# Patient Record
Sex: FEMALE | Race: WHITE
[De-identification: names, ages, dates, MRNs, and addresses within clinical notes are randomized per-mention and may not be internally consistent; named-entity substitution may affect disease eponyms.]

---

## 2016-06-24 VITALS
DIASTOLIC BLOOD PRESSURE: 84 MMHG | SYSTOLIC BLOOD PRESSURE: 131 MMHG | SYSTOLIC BLOOD PRESSURE: 131 MMHG | DIASTOLIC BLOOD PRESSURE: 84 MMHG | DIASTOLIC BLOOD PRESSURE: 84 MMHG | SYSTOLIC BLOOD PRESSURE: 131 MMHG

## 2017-10-23 ENCOUNTER — HOSPITAL ENCOUNTER (OUTPATIENT)
Dept: HOSPITAL 61 - KCIC MAMMO | Age: 57
Discharge: HOME | End: 2017-10-23
Attending: FAMILY MEDICINE
Payer: MEDICARE

## 2017-10-23 DIAGNOSIS — M48.061: ICD-10-CM

## 2017-10-23 DIAGNOSIS — M51.26: ICD-10-CM

## 2017-10-23 DIAGNOSIS — M41.86: ICD-10-CM

## 2017-10-23 DIAGNOSIS — Z12.31: Primary | ICD-10-CM

## 2017-10-23 PROCEDURE — 72148 MRI LUMBAR SPINE W/O DYE: CPT

## 2017-10-23 PROCEDURE — 77063 BREAST TOMOSYNTHESIS BI: CPT

## 2017-10-23 NOTE — [facility]
EXAM: Lumbar spine MRI without contrast.

 

HISTORY: Spinal stenosis. Lower back pain. Bilateral lower extremity 

weakness.

 

TECHNIQUE: Multiplanar, multisequence magnetic resonance imaging of the 

lumbar spine was performed without contrast.

 

COMPARISON: 12/22/2014

 

FINDINGS: There is lumbar dextroscoliosis centered at L3. There is slight 

retrolisthesis of L4 on L5 and L5 on S1. There is disc desiccation at 

L3-S1. There is degenerative endplate remodeling and Schmorl's node 

formation at these levels. The conus terminates at L1-L2. No suspicious 

osseous lesion is seen.

 

At L1-L2, there is mild bilateral facet arthropathy. There is no stenosis.

 

At L2-L3, there is mild bilateral facet arthropathy. There is no stenosis.

 

At L3-L4, there is a disc bulge and endplate osteophytosis. There is 

moderate facet arthropathy. There is mild central canal stenosis.

 

At L4-L5, there are bilateral foraminal to extraforaminal disc protrusions

superimposed on a disc bulge and endplate osteophytosis. There is mild 

right and moderate left facet arthropathy. There is hypertrophy of the 

ligamentum flavum. There is retrolisthesis. There is mild bilateral 

foraminal stenosis with abutment of the exiting L4 nerve root. There is 

moderate central canal stenosis.

 

At L5-S1, there is a broad-based posterior central disc protrusion and 

right foraminal to lateral disc protrusion and osteophyte complex 

superimposed on a diffuse disc bulge and right lateral predominant 

endplate osteophytosis. There is retrolisthesis. There is mild right 

foraminal stenosis with abutment of the exiting right L5 nerve root.

 

IMPRESSION: 

1. Multilevel degenerative change throughout the lumbar spine, described 

in detail above. This results in mild central canal stenosis at L3-L4, 

mild bilateral foraminal and moderate central canal stenosis at L4-L5, and

mild right foraminal stenosis at L5-S1. The previously demonstrated 

superior right lateral recess extrusion and right extraforaminal extrusion

at L4-L5 are no longer seen. However, the underlying bilateral foraminal 

to extra foraminal disc protrusions at this level have increased compared 

to the prior study. The aforementioned degenerative changes are similar 

the remainder of the lumbar levels compared to the prior study.

2. Mild scoliosis and mild retrolisthesis at the lower lumbar levels, 

stable in appearance.

 

Electronically signed by: Erika Boyce MD (10/23/2017 5:24 PM) 

[facility]

## 2017-10-24 NOTE — [facility]
DATE: 10/23/2017



EXAM: MAMMO DOUGLAS SCREENING BILATERAL



HISTORY: Asymptomatic screening mammogram. History of benign right breast

biopsy in 2003.



COMPARISON: Prior mammograms from 11/4/2009



This study was interpreted with the benefit of Computerized Aided Detection

(CAD).





The breast parenchyma shows scattered fibroglandular densities. Breast

parenchyma level B.





FINDINGS: Bilateral CC and MLO views of the breasts were performed. Bilateral

breast tomosynthesis was performed in CC and MLO projections.



Right breast: There is a small group of microcalcifications in the right

breast at middle depth, slightly inferior to the posterior nipple line on the

right MLO view and slightly lateral to the posterior nipple line on the CC

view. Further evaluation with a true lateral and spot magnification CC and

true lateral views are recommended.



Left breast: There are no suspicious microcalcifications, masses or areas of

architectural distortion.



Left breast is stable from prior mammogram.  





IMPRESSION: 

1. Incomplete right mammogram. Additional views are recommended, as detailed

above.

2. Negative left mammogram.







BI-RADS CATEGORY: 0 INCOMPLETE: NEEDS ADDITIONAL IMAGING EVALUATION AND/OR

PRIOR MAMMOGRAMS FOR COMPARISON.



RECOMMENDED FOLLOW-UP: ADD ADDITIONAL IMAGING



PQRS compliance statement: 



Mammography is a sensitive method for finding small breast cancers, but it

does not detect them all and is not a substitute for careful clinical

examination.  A negative mammogram does not negate a clinically suspicious

finding and should not result in delay in biopsying a clinically suspicious

abnormality.



"Our facility is accredited by the American [employer] Mammography

Program."

## 2017-11-09 ENCOUNTER — HOSPITAL ENCOUNTER (OUTPATIENT)
Dept: HOSPITAL 61 - KCIC MAMMO | Age: 57
Discharge: HOME | End: 2017-11-09
Attending: FAMILY MEDICINE
Payer: MEDICARE

## 2017-11-09 DIAGNOSIS — R92.1: Primary | ICD-10-CM

## 2017-11-09 DIAGNOSIS — F12.10: ICD-10-CM

## 2017-11-09 DIAGNOSIS — F17.200: ICD-10-CM

## 2017-11-09 NOTE — [facility]
DATE: 11/9/2017.



EXAM: DIGITAL DIAGNOSTIC RT.



HISTORY: Calcifications on mammographic screening.



COMPARISON: 10/23/2017.



This study was interpreted with the benefit of Computerized Aided Detection

(CAD).



FINDINGS:



The breast parenchyma is heterogeneously dense, which could reduce sensitivity

of mammography.  Breast parenchyma level C..



The cluster of calcifications of concern laterally on the right demonstrates

pleomorphic is a and remains indeterminate. There is no clear associated mass.



Other coarse calcifications are benign.     



BI-RADS CATEGORY: 4 SUSPICIOUS ABNORMALITY-BIOPSY SHOULD BE CONSIDERED.



RECOMMENDED FOLLOW-UP: BIO BIOPSY RECOMMENDED.

Recommend stereotactic biopsy of the cluster of calcifications laterally on

the right.



PQRS compliance statement: Patient information was entered into a reminder

system with a target due date (now) for the next mammogram.



Mammography is a sensitive method for finding small breast cancers, but it

does not detect them all and is not a substitute for careful clinical

examination.  A negative mammogram does not negate a clinically suspicious

finding and should not result in delay in biopsying a clinically suspicious

abnormality.



"Our facility is accredited by the American [employer] Mammography

Program."

## 2017-11-22 ENCOUNTER — HOSPITAL ENCOUNTER (OUTPATIENT)
Dept: HOSPITAL 61 - MAMMO | Age: 57
Discharge: HOME | End: 2017-11-22
Attending: FAMILY MEDICINE
Payer: COMMERCIAL

## 2017-11-22 DIAGNOSIS — R92.1: Primary | ICD-10-CM

## 2017-11-22 NOTE — RAD
DATE: 11/22/2017.



EXAM: DIGITAL DIAGNOSTIC RT.



HISTORY: Right breast microcalcifications. Stereotactic biopsy was requested.



COMPARISON: 11/9/2017, 10/23/2017, 11/4/2009.



FINDINGS:



The breast parenchyma is heterogeneously dense, which could reduce sensitivity

of mammography.  Breast parenchyma level C..



The cluster of calcifications laterally on the right were attempted to be

targeted stereotactically. They appeared to be within the skin on stereotactic

positioning. Additional digital mammographic images were obtained in CC, ML

and tangential positions. The calcifications of concern are either within the

skin or immediately subcutaneous tissues. They also appear stable since 2009

and are likely benign. Given the above, stereotactic biopsy was deferred.



These findings were explained to the patient. Please should return in 6 months

to confirm stability and benignity of the calcifications of concern.



BI-RADS CATEGORY: 3 PROBABLY BENIGN FINDING(S)-SHORT INTERVAL FOLLOW-UP

SUGGESTED.



RECOMMENDED FOLLOW-UP: 6M 6 MONTH FOLLOW-UP.

Recommend six-month follow-up right diagnostic mammography to include

tangential views of the target cluster of calcifications to confirm stability

and benignity.



PQRS compliance statement: Patient information was entered into a reminder

system with a target due date 5/22/2018 for the next mammogram.



Mammography is a sensitive method for finding small breast cancers, but it

does not detect them all and is not a substitute for careful clinical

examination.  A negative mammogram does not negate a clinically suspicious

finding and should not result in delay in biopsying a clinically suspicious

abnormality.



"Our facility is accredited by the American [employer] Mammography

Program."

## 2019-02-01 VITALS
SYSTOLIC BLOOD PRESSURE: 155 MMHG | SYSTOLIC BLOOD PRESSURE: 155 MMHG | DIASTOLIC BLOOD PRESSURE: 83 MMHG | DIASTOLIC BLOOD PRESSURE: 83 MMHG | SYSTOLIC BLOOD PRESSURE: 155 MMHG | DIASTOLIC BLOOD PRESSURE: 83 MMHG

## 2019-10-09 ENCOUNTER — HOSPITAL ENCOUNTER (OUTPATIENT)
Dept: HOSPITAL 61 - MRI | Age: 59
Discharge: HOME | End: 2019-10-09
Attending: FAMILY MEDICINE
Payer: COMMERCIAL

## 2019-10-09 DIAGNOSIS — M48.061: ICD-10-CM

## 2019-10-09 DIAGNOSIS — M47.26: Primary | ICD-10-CM

## 2019-10-09 DIAGNOSIS — M47.27: ICD-10-CM

## 2019-10-09 PROCEDURE — 72148 MRI LUMBAR SPINE W/O DYE: CPT

## 2019-10-09 NOTE — RAD
LUMBAR SPINE WO CONTRAST 

 

Date: 10/9/2019 2:45 PM 

 

Indication:  Low back pain with right lower extremity radiculopathy. Right

lower extremity weakness. 

 

Comparison:  10/23/2017. 

 

Technique: Multi-planar multi-weighted magnetic resonance imaging of the 

lumbar spine was performed without intravenous contrast using the standard

lumbar spine protocol.

 

FINDINGS:

 

Straightening of the lumbar lordosis. No acute fracture. Mild multilevel 

degenerative disc desiccation and disc height loss. No marrow replacing 

process to suggest malignancy.

 

The conus terminates at a normal level. No abnormal signal is seen within 

the visualized distal spinal cord. No clumping of intrathecal nerve roots.

 

No soft tissue abnormality in the visualized abdomen or pelvis.

 

T12-L1: No disc bulge. No facet arthropathy. No significant spinal 

stenosis or neural foraminal narrowing. 

 

L1-L2: No disc bulge. No facet arthropathy. No significant spinal stenosis

or neural foraminal narrowing. 

 

L2-L3: Disc bulge. Mild facet arthropathy. No significant spinal stenosis 

or neural foraminal narrowing. 

 

L3-L4: Disc bulge. Mild facet arthropathy. Mild spinal stenosis. No neural

foraminal narrowing. 

 

L4-L5: Disc bulge with annular tear and right greater than left far 

lateral protrusion. Mild facet arthropathy. Ligamentum flavum thickening. 

Moderate spinal stenosis and lateral recess narrowing. Moderate right and 

mild to moderate left neural foraminal narrowing. 

 

L5-S1: Disc bulge with right far lateral protrusion which abuts and 

displaces the exiting right L5 nerve root. Mild facet arthropathy. No 

spinal stenosis. Moderate right and mild left lateral recess narrowing. 

Moderate right neural foraminal narrowing. 

 

IMPRESSION:

 

Moderate lumbar spondylosis, worst at L4-5 and L5-S1.

 

Electronically signed by: Jeremy Dos Santos MD (10/9/2019 4:03 PM) 

[facility]

## 2019-10-21 ENCOUNTER — HOSPITAL ENCOUNTER (OUTPATIENT)
Dept: HOSPITAL 61 - US | Age: 59
Discharge: HOME | End: 2019-10-21
Attending: FAMILY MEDICINE
Payer: COMMERCIAL

## 2019-10-21 ENCOUNTER — HOSPITAL ENCOUNTER (OUTPATIENT)
Dept: HOSPITAL 61 - PNCL | Age: 59
Discharge: HOME | End: 2019-10-21
Attending: ANESTHESIOLOGY
Payer: COMMERCIAL

## 2019-10-21 DIAGNOSIS — M48.061: ICD-10-CM

## 2019-10-21 DIAGNOSIS — M79.89: Primary | ICD-10-CM

## 2019-10-21 DIAGNOSIS — M51.16: Primary | ICD-10-CM

## 2019-10-21 PROCEDURE — 93971 EXTREMITY STUDY: CPT

## 2019-10-21 PROCEDURE — G0463 HOSPITAL OUTPT CLINIC VISIT: HCPCS

## 2019-10-21 NOTE — PAIN
DATE OF SERVICE:  10/21/2019



PROGRESS NOTE FOR PAIN CLINIC



DIAGNOSES:  Lumbar radiculopathy with lumbar degenerative disk disease, lumbar

spinal stenosis.



HISTORY OF PRESENT ILLNESS:  The patient is a 58-year-old female who returns in

followup status post lumbar epidural steroid injection, last seen March 2015. 

The patient did very well with about 80% improvement at that time.  The patient

reports the pain is returning in the low back and right lower extremity as it

was previously, posterior gluteus, posterior thigh, posterior calf, also some in

the anterior lateral thigh and medial calf, but also in the posterior aspect of

the leg distally.  The patient reports it is a throbbing, stabbing, shooting

with numbness and tingling, radiating, intermittent in intensity, worse with

walking, standing, changing positions, described as cramping, aching, also some

upper back pain, which is tender and tight, burning as well.  The patient

reports she did well with the epidural injections in the past.  She also is

currently doing exercise and stretching and strength exercises and interested in

additional physical therapy which we will order for her today.  The patient will

continue doing this on her own as well.  The patient reports disability rating

from 0-10, 10 being the worst, is a 9 with family home responsibilities,

recreation, social activity; 7 with self-care and 7 with life support

activities.  The patient did have an MRI scan showing at L4-L5 and L5-S1

significant encroachment of the disks with degenerative disk changes and

encroachment with stenosis at both levels, more on the right at L4-L5 and L5-S1

with contact of the left L5-S1 nerve root.  The patient's old chart was reviewed

as her current medication regimen updated.  Current review of systems updated

today as well.



PHYSICAL EXAMINATION:

VITAL SIGNS:  The patient's blood pressure is 126/81, pulse 77, respirations 16,

temperature 97.9 degrees Fahrenheit, height is 5 feet 7 inches, weight is 125

pounds.

GENERAL:  The patient is awake, alert, oriented, appropriate, very pleasant

demeanor.

HEENT:  Shows normocephalic, atraumatic.  Extraocular movements are intact and

symmetrical.  Oral cavity:  Mucous membranes moist and pink.  Dentition is

intact.

NECK:  Shows anterior throat supple without palpable lymphadenopathy noted. 

Swallow reflex symmetrical.

CHEST:  Shows normal on inspection.  Breath sounds are clear to auscultation

bilaterally.

HEART:  Shows S1, S2 clear.  No murmurs auscultated.

ABDOMEN:  Soft, nontender, nondistended.  No palpable organomegaly is noted.  No

rebound or guarding demonstrated.

BACK:  Shows spine grossly in the midline.  Normal-appearing thoracic kyphosis

and some slight flattening of lumbar lordotic curvature.  Lumbar paraspinous

muscle shows symmetrical on inspection, on palpation shows moderate tenderness

diffusely, but only diffusely without significant radiation.  The patient has

good rotational motion of lumbar spine both laterally as well as extension and

flexion without difficulty.

EXTREMITIES:  The patient's lower extremities show deep tendon reflexes 1+ in

the patellar and tendo-calcaneus tendons.  Motor exam is 4 on a scale of 5 with

right dorsiflexion and extension, 5/5 on the left.  Peripheral pulses are 1+

posterior tibia.  No peripheral edema is noted.  No clubbing or cyanosis.  The

patient's straight leg raise noted to be mildly positive on the right side at

about 35-40 degrees.  Left side is negative.  Gaenslen's and Huseyin's maneuvers

are negative bilaterally.



PLAN:  Options were discussed with the patient.  The patient's old chart was

reviewed as her current medication regimen updated.  Current review of systems

updated today as well.  We will preauthorize the patient for a lumbar epidural

steroid injection.  She has done very well with these in the past with

persistent and returning L5-S1 dermatomal distribution radiculopathy on the

right side.  Also, the patient will continue doing exercises and stretching and

strengthening on her own.  We will renew the patient's physical therapy orders

today for continued physical therapy, she has done well with these in the past

as well, to work on the upper back in the thoracic distribution with some

muscular pain there as well.  We will try Medrol Dosepak in the meantime.  The

patient was given instruction as well as side effects to be aware of with the

medication.  She will return for a translaminar approach L5-S1 lumbar epidural

steroid injection for L5-S1 right-sided radiculopathy once approval is obtained.

 



______________________________

CHRISTINE RUTHERFORD MD



DR:  MONIQUE/haider  JOB#:  [id number] / [id number]

DD:  10/21/2019 14:02  DT:  10/21/2019 14:35

## 2019-10-21 NOTE — RAD
EXAM: Right lower extremity venous Doppler sonogram.

 

HISTORY: Pain.

 

TECHNIQUE: Gray scale and color Doppler sonographic evaluation of the 

right lower extremity veins with spectral waveform analysis was performed.

 

FINDINGS: There is normal color flow, normal compressibility and there are

normal spectral waveforms in the common femoral, superficial femoral, 

popliteal, posterior tibial and greater saphenous veins.

 

IMPRESSION: No Doppler evidence of lower extremity deep venous thrombosis.

 

Electronically signed by: Erika Boyce MD (10/21/2019 3:09 PM) [place] [id number]

## 2021-06-21 ENCOUNTER — HOSPITAL ENCOUNTER (EMERGENCY)
Dept: HOSPITAL 61 - ER | Age: 61
LOS: 1 days | Discharge: HOME | End: 2021-06-22
Payer: COMMERCIAL

## 2021-06-21 VITALS — WEIGHT: 123.24 LBS | HEIGHT: 66 IN | BODY MASS INDEX: 19.81 KG/M2

## 2021-06-21 DIAGNOSIS — Z88.2: ICD-10-CM

## 2021-06-21 DIAGNOSIS — K21.9: ICD-10-CM

## 2021-06-21 DIAGNOSIS — Z88.8: ICD-10-CM

## 2021-06-21 DIAGNOSIS — Z88.5: ICD-10-CM

## 2021-06-21 DIAGNOSIS — E03.9: ICD-10-CM

## 2021-06-21 DIAGNOSIS — K04.7: Primary | ICD-10-CM

## 2021-06-21 DIAGNOSIS — J44.9: ICD-10-CM

## 2021-06-21 DIAGNOSIS — F17.200: ICD-10-CM

## 2021-06-21 DIAGNOSIS — E78.00: ICD-10-CM

## 2021-06-21 DIAGNOSIS — I10: ICD-10-CM

## 2021-06-21 PROCEDURE — 99283 EMERGENCY DEPT VISIT LOW MDM: CPT

## 2021-06-22 VITALS — DIASTOLIC BLOOD PRESSURE: 69 MMHG | SYSTOLIC BLOOD PRESSURE: 139 MMHG

## 2021-06-22 NOTE — PHYS DOC
Past Medical History


Past Medical History:  COPD, Fibromyalgia, GERD, High Cholesterol, Hypertension,

Hypothyroid


Additional Past Medical Histor:  emphysema


Past Surgical History:  Tubal ligation


Smoking Status:  Current Every Day Smoker


Alcohol Use:  Sober


Drug Use:  Marijuana, Methamphetamine





General Adult


EDM:


Chief Complaint:  MULTIPLE COMPLAINTS





HPI:


HPI:





Patient is a 60  year old FEMALE presents with the chief complaint of oral 

abscess.


Patient has on and off history of oral abscess x 2 years.


Patient states PCP has placed her on amoxil in the past.


Patient has been referred to [facility] and dentist for definative treatment but she 

states she has not been able to find a provider that takes her insurance.


Current abscess upper left and right posterior to canines hard palate.


Onset abscess last night.





Patient also complains of heat related symptoms.


She is concerned about heat exhaustion.  Patient she has been nauseous and very 

fatigued for the last 4 days.


Patient states she has no air-conditioning at her home.





Review of Systems:


Constitutional symptoms-  No fever, no chills.  Positive fatigue


Eyes- No Discharge, No Visual Loss


Respiratory symptoms-  No shortness of breath, No wheezing, No Dyspnea on 

Exertion


Cardiovascular Systems; No chest pain, No Palpitations, No syncope


Gastrointestinal symptoms:  NO abdominal pain, Positive nausea, no vomiting or 

diarrhea.


Genitourinary symptoms:  No dysuria.  


Musculoskeletal symptoms:  No back pain  No extremity pain.


NEUROLOGICAL Symptoms:  No headache, no generalized weakness; No focal Weakness


Skin: No rash. 


HEENT positive dental abscess





Heart Score:


C/O Chest Pain:  N/A


Risk Factors:


Risk Factors:  DM, Current or recent (<one month) smoker, HTN, HLP, family 

history of CAD, obesity.


Risk Scores:


Score 0 - 3:  2.5% MACE over next 6 weeks - Discharge Home


Score 4 - 6:  20.3% MACE over next 6 weeks - Admit for Clinical Observation


Score 7 - 10:  72.7% MACE over next 6 weeks - Early Invasive Strategies





Allergies:


Allergies:





Allergies








Coded Allergies Type Severity Reaction Last Updated Verified


 


  antivenin,crotalidae polyvalent imm Allergy Intermediate UNSPECIFIED ALLERGY 

TO UNSPECIFIED ANTIVENIN 7/1/18 Yes


 


  codeine Allergy Intermediate  7/1/18 Yes


 


  hydrocodone Allergy Intermediate  10/21/19 Yes


 


  sulfamethoxazole Allergy Intermediate  7/1/18 Yes











Physical Exam:


PE:





General: alert, no acute distress.


Skin: warm, dry and intact.


HENT: bilateral external ears normal, oropharynx moist, nose normal.  Dental 

abscess right and left upper hard palate posterior to teeth/canines


Head::  Normocephalic, atraumatic.


Neck:   Trachea midline.


Eyes: EOMI, Normal conjunctiva, No drainage


CARDIOVASCULAR:  Regular rate and rhythm


RESPIRATORY:  No respiratory distress


Back:  Full range of motion.


Skin: Warm, dry, no erythema, no rash. 


MUSCULOSKELETAL:  Full range of motion of bilateral upper and lower extremities.


GASTROINTESTINAL: Abdomen soft without rebound or guarding.


NEUROLOGICAL:  Alert and noted to person, place and time.  No neurological 

deficits observed


Psychiatric:  Cooperative.  Normal judgment





Current Patient Data:


Vital Signs:





                                   Vital Signs








  Date Time  Temp Pulse Resp B/P (MAP) Pulse Ox O2 Delivery O2 Flow Rate FiO2


 


6/22/21 00:05 98.7 75 18 148/112 (124) 97 Nasal Cannula 2.0 





 98.7       











EKG:


EKG:


[]





Radiology/Procedures:


Radiology/Procedures:


[]





Course & Med Decision Making:


Course & Med Decision Making


Pertinent Labs and Imaging studies reviewed. (See chart for details)





[] Patient treated with hydrocodone and clindamycin in the ER.  Patient 

tolerated p.o.  Her vital signs are all stable 


Patient advised to follow-up with dentist she was prescribed hydrocodone and 

clindamycin.





Dragon Disclaimer:


Dragon Disclaimer:


This electronic medical record was generated, in whole or in part, using a voice

 recognition dictation system.





Departure


Departure


Impression:  


   Primary Impression:  


   Dental abscess


Disposition:  01 HOME / SELF CARE / HOMELESS


Condition:  STABLE


Referrals:  


LINDA GODOY MD (PCP)


Patient Instructions:  Dental Abscess, Heat-Related Illness


Scripts


Hydrocodone/Acetaminophen (Hydrocodone-Acetamin 5-325 mg) 1 Each Tablet


1 EACH PO Q4-6HRS, #20 TAB


   Prov: WAYLON HIDALGO DO         6/22/21 


Clindamycin Hcl (CLINDAMYCIN HCL) 300 Mg Capsule


1 CAP PO TID, #21 CAP


   Prov: WAYLON HIDALGO DO         6/22/21











WAYLON HIDALGO DO            Jun 22, 2021 01:22